# Patient Record
Sex: FEMALE | Race: BLACK OR AFRICAN AMERICAN | Employment: FULL TIME | URBAN - METROPOLITAN AREA
[De-identification: names, ages, dates, MRNs, and addresses within clinical notes are randomized per-mention and may not be internally consistent; named-entity substitution may affect disease eponyms.]

---

## 2019-11-29 ENCOUNTER — TELEPHONE (OUTPATIENT)
Dept: OBGYN CLINIC | Facility: CLINIC | Age: 29
End: 2019-11-29

## 2019-11-29 ENCOUNTER — OFFICE VISIT (OUTPATIENT)
Dept: OBGYN CLINIC | Facility: CLINIC | Age: 29
End: 2019-11-29
Payer: COMMERCIAL

## 2019-11-29 ENCOUNTER — APPOINTMENT (OUTPATIENT)
Dept: LAB | Facility: HOSPITAL | Age: 29
End: 2019-11-29
Attending: ADVANCED PRACTICE MIDWIFE
Payer: COMMERCIAL

## 2019-11-29 VITALS — SYSTOLIC BLOOD PRESSURE: 134 MMHG | WEIGHT: 150 LBS | DIASTOLIC BLOOD PRESSURE: 83 MMHG | HEART RATE: 94 BPM

## 2019-11-29 DIAGNOSIS — Z11.3 SCREEN FOR STD (SEXUALLY TRANSMITTED DISEASE): ICD-10-CM

## 2019-11-29 DIAGNOSIS — B37.3 YEAST INFECTION INVOLVING THE VAGINA AND SURROUNDING AREA: ICD-10-CM

## 2019-11-29 DIAGNOSIS — B37.3 YEAST INFECTION INVOLVING THE VAGINA AND SURROUNDING AREA: Primary | ICD-10-CM

## 2019-11-29 PROCEDURE — 87210 SMEAR WET MOUNT SALINE/INK: CPT | Performed by: ADVANCED PRACTICE MIDWIFE

## 2019-11-29 PROCEDURE — 83036 HEMOGLOBIN GLYCOSYLATED A1C: CPT

## 2019-11-29 PROCEDURE — 36415 COLL VENOUS BLD VENIPUNCTURE: CPT

## 2019-11-29 PROCEDURE — 99202 OFFICE O/P NEW SF 15 MIN: CPT | Performed by: ADVANCED PRACTICE MIDWIFE

## 2019-11-29 RX ORDER — NORETHINDRONE ACETATE AND ETHINYL ESTRADIOL 1; .02 MG/1; MG/1
1 TABLET ORAL DAILY
COMMUNITY

## 2019-11-29 RX ORDER — FLUCONAZOLE 150 MG/1
150 TABLET ORAL
Qty: 3 TABLET | Refills: 0 | Status: SHIPPED | OUTPATIENT
Start: 2019-11-29 | End: 2019-12-06

## 2019-11-29 NOTE — TELEPHONE ENCOUNTER
Pt was advised she is to take Fluconazole 150mg every 3 days for 3 doses. Pt verbalized understanding.

## 2019-11-29 NOTE — PROGRESS NOTES
Eriberto Chambers is a 34year old female. HPI:   Patient presents with:  Gyn Problem: NP, c/o persistent vaginal irritation since July. Notes itching, intercourse can be painful    Reports vaginal itching and irritation since July.    Has seen different prov pain, vaginal bleeding, difficulty urinating, genital sores, menstrual problem, pelvic pain, sexual dysfunction, breast mass, breast pain and hot flashes.        PHYSICAL EXAM:      11/29/19  1413   BP: 134/83   Pulse: 94     Physical Exam   Vitals reviewed

## 2020-04-16 ENCOUNTER — TELEPHONE (OUTPATIENT)
Dept: OBGYN CLINIC | Facility: CLINIC | Age: 30
End: 2020-04-16

## 2020-04-16 NOTE — TELEPHONE ENCOUNTER
Pt scheduled via [address] to leave on schedule?             Appointment For: Eriberto Chambers ([id number])   Visit Type: Cindy Peters ([id number])      4/21/2020   10:00 AM  15 mins. Abhi Espino CNM  [facility]      Patient Comments:   Vaginal Con

## 2020-04-17 NOTE — TELEPHONE ENCOUNTER
Pt is okay with phone visit, okay to leave on schedule for same day?  Or does it need to be scheduled a different day

## 2020-04-21 ENCOUNTER — PATIENT MESSAGE (OUTPATIENT)
Dept: OBGYN CLINIC | Facility: CLINIC | Age: 30
End: 2020-04-21

## 2020-04-21 ENCOUNTER — TELEMEDICINE (OUTPATIENT)
Dept: OBGYN CLINIC | Facility: CLINIC | Age: 30
End: 2020-04-21

## 2020-04-21 DIAGNOSIS — B37.3 YEAST INFECTION INVOLVING THE VAGINA AND SURROUNDING AREA: Primary | ICD-10-CM

## 2020-04-21 PROCEDURE — 99213 OFFICE O/P EST LOW 20 MIN: CPT | Performed by: ADVANCED PRACTICE MIDWIFE

## 2020-04-21 RX ORDER — FLUCONAZOLE 150 MG/1
150 TABLET ORAL
Qty: 2 TABLET | Refills: 0 | Status: SHIPPED | OUTPATIENT
Start: 2020-04-21 | End: 2020-04-25

## 2020-04-21 NOTE — TELEPHONE ENCOUNTER
From: Chantal Jin  To: Yael Velazquez CNM  Sent: 4/21/2020 11:22 AM CDT  Subject: Other    Hello, will there still be an appointment today?  It was scheduled for 11:15am.

## 2020-04-21 NOTE — PROGRESS NOTES
Melanie White is a 34year old female. HPI:   No chief complaint on file. No LMP recorded. This visit was performed via telehealth with video and audio and pt consented to visit. In March had wisdom teeth pulled and was on antibiotics.  Pelon Irwin Exam    Constitutional: She is oriented to person, place, and time. She appears well-developed and well-nourished. Pulmonary/Chest: Effort normal.   No distress     Neurological: She is alert and oriented to person, place, and time.         ASSESSMENT/LORENE

## 2020-12-29 ENCOUNTER — TELEPHONE (OUTPATIENT)
Dept: OBGYN CLINIC | Facility: CLINIC | Age: 30
End: 2020-12-29

## 2020-12-29 NOTE — TELEPHONE ENCOUNTER
Patient last seen in 2019, patient indicates she is 22 weeks pregnant and asking if she can still be seen. Patient also has questions about labor/delvery in tub. Also asking if rx:netrooxcid during covid.  Please call UA:[phone] or send response floyd

## 2020-12-29 NOTE — TELEPHONE ENCOUNTER
Pt's questions answered. Pt advised she can fax her PN records to our office so CNM can review for CHRISTELLE. Pt agreed and voiced understanding.

## 2021-01-04 ENCOUNTER — PATIENT MESSAGE (OUTPATIENT)
Dept: OBGYN CLINIC | Facility: CLINIC | Age: 31
End: 2021-01-04

## 2021-01-06 ENCOUNTER — TELEPHONE (OUTPATIENT)
Dept: OBGYN CLINIC | Facility: CLINIC | Age: 31
End: 2021-01-06

## 2021-01-06 NOTE — TELEPHONE ENCOUNTER
Please have pt make meet & greet appt. I have some mychart sent to me from this patient. I wasn't sure what it was but looks like maybe she was sending records for transfer when I look at phone encounters.  I can see her labs and ultrasound but not much els

## 2021-05-24 ENCOUNTER — TELEPHONE (OUTPATIENT)
Dept: LACTATION | Facility: HOSPITAL | Age: 31
End: 2021-05-24

## 2021-05-26 ENCOUNTER — NURSE ONLY (OUTPATIENT)
Dept: LACTATION | Facility: HOSPITAL | Age: 31
End: 2021-05-26
Attending: ADVANCED PRACTICE MIDWIFE
Payer: COMMERCIAL

## 2021-05-26 DIAGNOSIS — O92.79 DISORDER OF LACTATION, POSTPARTUM CONDITION OR COMPLICATION: Primary | ICD-10-CM

## 2021-05-26 PROCEDURE — 99213 OFFICE O/P EST LOW 20 MIN: CPT

## 2021-05-26 NOTE — PROGRESS NOTES
LACTATION NOTE - MOTHER      Evaluation Type: Outpatient Initial    Problems identified  Problems identified: Milk supply WNL; Knowledge deficit    Maternal history  Other/comment: denies    Breastfeeding goal  Breastfeeding goal: To maintain breast milk fe breast.  Recommendation is to get the Spectra pump with a hospital strength motor. Ameda Platinum pump worked well to express milk and could consider renting this to protect milk supply.   Diet for Breastfeeding:  Diet discussed at visit and dairy may not

## 2021-05-26 NOTE — PATIENT INSTRUCTIONS
Infant Discharge Feeding Plan -      Snuggle your baby in skin to skin contact between and during feedings whenever possible. Massage your breasts before nursing or pumping to soften areola if needed.     Breastfeed with hunger cues: Most babies wi formula if breast milk is not to volume infant requires. Hour of Age  Intake (mL/feed)  1st 24 hours 2–10  24–48 hours 5–15  48–72 hours 15–30  72–96 hours 30–60  Day 10: 2-3 ounces per feeding. 4 weeks: 3-4 ounces or more per feeding.     Paced bottle [phone])  • Call your baby's doctor with questions as well. For additional information: [employer]Home    Breast Pump Recommendation:  Lino Fulton is not working to draw milk from the breast.  Recommendation is to get the Spectra pump with a hosp

## 2022-02-11 ENCOUNTER — OFFICE VISIT (OUTPATIENT)
Dept: OBGYN CLINIC | Facility: CLINIC | Age: 32
End: 2022-02-11
Payer: COMMERCIAL

## 2022-02-11 VITALS
DIASTOLIC BLOOD PRESSURE: 71 MMHG | SYSTOLIC BLOOD PRESSURE: 101 MMHG | WEIGHT: 150 LBS | HEIGHT: 65 IN | BODY MASS INDEX: 24.99 KG/M2 | HEART RATE: 76 BPM

## 2022-02-11 DIAGNOSIS — Z13.220 SCREENING CHOLESTEROL LEVEL: Primary | ICD-10-CM

## 2022-02-11 DIAGNOSIS — Z13.0 SCREENING FOR DEFICIENCY ANEMIA: ICD-10-CM

## 2022-02-11 DIAGNOSIS — Z01.419 WOMEN'S ANNUAL ROUTINE GYNECOLOGICAL EXAMINATION: ICD-10-CM

## 2022-02-11 PROCEDURE — 3008F BODY MASS INDEX DOCD: CPT | Performed by: ADVANCED PRACTICE MIDWIFE

## 2022-02-11 PROCEDURE — 3074F SYST BP LT 130 MM HG: CPT | Performed by: ADVANCED PRACTICE MIDWIFE

## 2022-02-11 PROCEDURE — 99395 PREV VISIT EST AGE 18-39: CPT | Performed by: ADVANCED PRACTICE MIDWIFE

## 2022-02-11 PROCEDURE — 3078F DIAST BP <80 MM HG: CPT | Performed by: ADVANCED PRACTICE MIDWIFE

## 2022-03-14 ENCOUNTER — TELEPHONE (OUTPATIENT)
Dept: OBGYN CLINIC | Facility: CLINIC | Age: 32
End: 2022-03-14

## 2022-05-18 ENCOUNTER — TELEPHONE (OUTPATIENT)
Dept: OBGYN CLINIC | Facility: CLINIC | Age: 32
End: 2022-05-18

## 2022-12-20 ENCOUNTER — TELEPHONE (OUTPATIENT)
Dept: OBGYN CLINIC | Facility: CLINIC | Age: 32
End: 2022-12-20

## 2022-12-20 NOTE — TELEPHONE ENCOUNTER
Patient want a nurse to call she has a positive pregnancy test want to speak with a nurse to see if its too soon to make an appointment . ...

## 2022-12-20 NOTE — TELEPHONE ENCOUNTER
Spoke to patient. Pt has two positive pregnancy test at home. Pt has already scheduled visit for 12/23 at [address] [place] [address].  Pt verbalized understanding and agrees with care plan

## 2022-12-23 ENCOUNTER — OFFICE VISIT (OUTPATIENT)
Dept: OBGYN CLINIC | Facility: CLINIC | Age: 32
End: 2022-12-23
Payer: COMMERCIAL

## 2022-12-23 VITALS
HEART RATE: 94 BPM | SYSTOLIC BLOOD PRESSURE: 119 MMHG | BODY MASS INDEX: 23.99 KG/M2 | WEIGHT: 144 LBS | HEIGHT: 65 IN | DIASTOLIC BLOOD PRESSURE: 85 MMHG

## 2022-12-23 DIAGNOSIS — N92.6 MISSED MENSES: Primary | ICD-10-CM

## 2022-12-23 LAB
CONTROL LINE PRESENT WITH A CLEAR BACKGROUND (YES/NO): YES YES/NO
PREGNANCY TEST, URINE: POSITIVE

## 2022-12-23 PROCEDURE — 3079F DIAST BP 80-89 MM HG: CPT | Performed by: ADVANCED PRACTICE MIDWIFE

## 2022-12-23 PROCEDURE — 3074F SYST BP LT 130 MM HG: CPT | Performed by: ADVANCED PRACTICE MIDWIFE

## 2022-12-23 PROCEDURE — 81025 URINE PREGNANCY TEST: CPT | Performed by: ADVANCED PRACTICE MIDWIFE

## 2022-12-23 PROCEDURE — 99214 OFFICE O/P EST MOD 30 MIN: CPT | Performed by: ADVANCED PRACTICE MIDWIFE

## 2022-12-23 PROCEDURE — 3008F BODY MASS INDEX DOCD: CPT | Performed by: ADVANCED PRACTICE MIDWIFE

## 2022-12-23 RX ORDER — CHOLECALCIFEROL (VITAMIN D3) 25 MCG
1 TABLET,CHEWABLE ORAL DAILY
COMMUNITY

## 2023-01-09 ENCOUNTER — NURSE ONLY (OUTPATIENT)
Dept: OBGYN CLINIC | Facility: CLINIC | Age: 33
End: 2023-01-09
Payer: COMMERCIAL

## 2023-01-09 ENCOUNTER — TELEPHONE (OUTPATIENT)
Dept: OBGYN CLINIC | Facility: CLINIC | Age: 33
End: 2023-01-09

## 2023-01-09 DIAGNOSIS — Z34.81 ENCOUNTER FOR SUPERVISION OF OTHER NORMAL PREGNANCY IN FIRST TRIMESTER: Primary | ICD-10-CM

## 2023-01-09 NOTE — TELEPHONE ENCOUNTER
Pt asking if 1st trimester ultrasound can be scheduled at [facility], or does this have to be later.     Pls advise

## 2023-01-09 NOTE — TELEPHONE ENCOUNTER
Spoke to patient advised ultrasound can be schedule for 7 wks.  Patient agrees with plan and verbally understands

## 2023-01-09 NOTE — PROGRESS NOTES
Phone OB RN education visit completed, educational material reviewed. Pt verbalized understanding. Orders placed for NOB labs including varicella titers. Pt has previously completed hemoglobin evaluation and results were normal. Pt's last pap smear was in  and was normal.  Pt undecided about genetic screening, pt aware of time frame. Pt will complete EPDS and EVETTE-7 during next appt. Pt was scheduled for NOB appt. Pt desires natural birth. Pt. Has answered NO 5P questions and has NO  risk factors. Pt. Given What pregnant women need to know handout. Pt counseled on ACOG & ACNM guidelines recommending carrier testing. Carrier Testing, including Hemoglobin Evaluation offered through pt & insurance choice of [facility] or [facility]. Pt undecided.

## 2023-01-31 ENCOUNTER — LAB ENCOUNTER (OUTPATIENT)
Dept: LAB | Facility: HOSPITAL | Age: 33
End: 2023-01-31
Attending: ADVANCED PRACTICE MIDWIFE
Payer: COMMERCIAL

## 2023-01-31 DIAGNOSIS — Z34.81 ENCOUNTER FOR SUPERVISION OF OTHER NORMAL PREGNANCY IN FIRST TRIMESTER: ICD-10-CM

## 2023-01-31 LAB
ANTIBODY SCREEN: NEGATIVE
BASOPHILS # BLD AUTO: 0.01 X10(3) UL (ref 0–0.2)
BASOPHILS NFR BLD AUTO: 0.2 %
DEPRECATED RDW RBC AUTO: 42 FL (ref 35.1–46.3)
EOSINOPHIL # BLD AUTO: 0.02 X10(3) UL (ref 0–0.7)
EOSINOPHIL NFR BLD AUTO: 0.4 %
ERYTHROCYTE [DISTWIDTH] IN BLOOD BY AUTOMATED COUNT: 13.9 % (ref 11–15)
HBV SURFACE AG SER-ACNC: <0.1 [IU]/L
HBV SURFACE AG SERPL QL IA: NONREACTIVE
HCT VFR BLD AUTO: 37.4 %
HCV AB SERPL QL IA: NONREACTIVE
HGB BLD-MCNC: 12.3 G/DL
IMM GRANULOCYTES # BLD AUTO: 0.01 X10(3) UL (ref 0–1)
IMM GRANULOCYTES NFR BLD: 0.2 %
LYMPHOCYTES # BLD AUTO: 1.48 X10(3) UL (ref 1–4)
LYMPHOCYTES NFR BLD AUTO: 32.3 %
MCH RBC QN AUTO: 27.2 PG (ref 26–34)
MCHC RBC AUTO-ENTMCNC: 32.9 G/DL (ref 31–37)
MCV RBC AUTO: 82.7 FL
MONOCYTES # BLD AUTO: 0.35 X10(3) UL (ref 0.1–1)
MONOCYTES NFR BLD AUTO: 7.6 %
NEUTROPHILS # BLD AUTO: 2.71 X10 (3) UL (ref 1.5–7.7)
NEUTROPHILS # BLD AUTO: 2.71 X10(3) UL (ref 1.5–7.7)
NEUTROPHILS NFR BLD AUTO: 59.3 %
PLATELET # BLD AUTO: 210 10(3)UL (ref 150–450)
RBC # BLD AUTO: 4.52 X10(6)UL
RH BLOOD TYPE: POSITIVE
RUBV IGG SER QL: POSITIVE
RUBV IGG SER-ACNC: 226.8 IU/ML (ref 10–?)
WBC # BLD AUTO: 4.6 X10(3) UL (ref 4–11)

## 2023-01-31 PROCEDURE — 86900 BLOOD TYPING SEROLOGIC ABO: CPT

## 2023-01-31 PROCEDURE — 86901 BLOOD TYPING SEROLOGIC RH(D): CPT

## 2023-01-31 PROCEDURE — 87086 URINE CULTURE/COLONY COUNT: CPT

## 2023-01-31 PROCEDURE — 86762 RUBELLA ANTIBODY: CPT

## 2023-01-31 PROCEDURE — 86780 TREPONEMA PALLIDUM: CPT

## 2023-01-31 PROCEDURE — 87340 HEPATITIS B SURFACE AG IA: CPT

## 2023-01-31 PROCEDURE — 87389 HIV-1 AG W/HIV-1&-2 AB AG IA: CPT

## 2023-01-31 PROCEDURE — 86787 VARICELLA-ZOSTER ANTIBODY: CPT

## 2023-01-31 PROCEDURE — 36415 COLL VENOUS BLD VENIPUNCTURE: CPT

## 2023-01-31 PROCEDURE — 85025 COMPLETE CBC W/AUTO DIFF WBC: CPT

## 2023-01-31 PROCEDURE — 86850 RBC ANTIBODY SCREEN: CPT

## 2023-01-31 PROCEDURE — 86803 HEPATITIS C AB TEST: CPT

## 2023-02-01 ENCOUNTER — TELEPHONE (OUTPATIENT)
Dept: OBGYN CLINIC | Facility: CLINIC | Age: 33
End: 2023-02-01

## 2023-02-01 LAB
T PALLIDUM AB SER QL: NEGATIVE
VZV IGG SER IA-ACNC: 3633 (ref 165–?)

## 2023-02-01 NOTE — TELEPHONE ENCOUNTER
Spoke with pt advised of normal lab results including HIV per MBW. Pt agreed and voiced understanding.

## 2023-02-20 ENCOUNTER — INITIAL PRENATAL (OUTPATIENT)
Dept: OBGYN CLINIC | Facility: CLINIC | Age: 33
End: 2023-02-20

## 2023-02-20 ENCOUNTER — HOSPITAL ENCOUNTER (OUTPATIENT)
Dept: ULTRASOUND IMAGING | Facility: HOSPITAL | Age: 33
Discharge: HOME | End: 2023-02-20
Attending: ADVANCED PRACTICE MIDWIFE
Payer: COMMERCIAL

## 2023-02-20 VITALS
WEIGHT: 145 LBS | HEART RATE: 87 BPM | SYSTOLIC BLOOD PRESSURE: 124 MMHG | BODY MASS INDEX: 24 KG/M2 | DIASTOLIC BLOOD PRESSURE: 76 MMHG

## 2023-02-20 DIAGNOSIS — Z11.3 SCREENING FOR STDS (SEXUALLY TRANSMITTED DISEASES): ICD-10-CM

## 2023-02-20 DIAGNOSIS — N89.8 VAGINAL DISCHARGE IN PREGNANCY IN FIRST TRIMESTER: ICD-10-CM

## 2023-02-20 DIAGNOSIS — N92.6 MISSED MENSES: ICD-10-CM

## 2023-02-20 DIAGNOSIS — Z34.81 ENCOUNTER FOR SUPERVISION OF OTHER NORMAL PREGNANCY IN FIRST TRIMESTER: Primary | ICD-10-CM

## 2023-02-20 DIAGNOSIS — O26.891 VAGINAL DISCHARGE IN PREGNANCY IN FIRST TRIMESTER: ICD-10-CM

## 2023-02-20 LAB — TRICHOMONAS SCREEN: NEGATIVE

## 2023-02-20 PROCEDURE — 3078F DIAST BP <80 MM HG: CPT | Performed by: ADVANCED PRACTICE MIDWIFE

## 2023-02-20 PROCEDURE — 76801 OB US < 14 WKS SINGLE FETUS: CPT | Performed by: ADVANCED PRACTICE MIDWIFE

## 2023-02-20 PROCEDURE — 76817 TRANSVAGINAL US OBSTETRIC: CPT | Performed by: ADVANCED PRACTICE MIDWIFE

## 2023-02-20 PROCEDURE — 93975 VASCULAR STUDY: CPT | Performed by: ADVANCED PRACTICE MIDWIFE

## 2023-02-20 PROCEDURE — 3074F SYST BP LT 130 MM HG: CPT | Performed by: ADVANCED PRACTICE MIDWIFE

## 2023-02-20 NOTE — PROGRESS NOTES
Some headaches - taking tylenol as needed. Hydrating and discussed can have caffeine as well. Discussed genetic testing, routine anatomy scan.

## 2023-02-21 LAB
C TRACH DNA SPEC QL NAA+PROBE: NEGATIVE
N GONORRHOEA DNA SPEC QL NAA+PROBE: NEGATIVE

## 2023-02-22 ENCOUNTER — TELEPHONE (OUTPATIENT)
Dept: OBGYN CLINIC | Facility: CLINIC | Age: 33
End: 2023-02-22

## 2023-02-22 LAB
GENITAL VAGINOSIS SCREEN: NEGATIVE
TRICHOMONAS SCREEN: NEGATIVE

## 2023-02-22 NOTE — TELEPHONE ENCOUNTER
----- Message from Braulio Reynolds CNM sent at 2/22/2023  1:45 PM CST -----  Normal ultrasound. Baby was measuring ahead 6 days. Confirm that she has regular and certain LMP. If not we should consider changing MARK. If she has regular 28 day cycles we can keep her MARK by LMP.  Thanks HAN

## 2023-02-22 NOTE — TELEPHONE ENCOUNTER
Notified pt of results & instructions per MJ. Pt states her cycles are regular & 28 days approx per her appt.  Pt verbalized an understanding & agrees w/ plan

## 2023-03-20 NOTE — PROGRESS NOTES
Marylou Barron is a 29year old  pt at 17w0d here for KRAEN  She is feeling well   Flu vac declined  Genetic testing desired  Pt started crying when we discussed her weight b/c it triggered memories of when her son had to be admitted to [address] [place] [address] for weight loss. She asked to call her Trg Jim Nathan. I spoke with both of them. Her weight gain is fine today. She also was upset that she was called about her US results and that there was a 6 day difference. She confirms she has regular 28 day cycles and that we will keep her EDC by her LMP of 23. I explained that future US will also have a difference in dates, but that we would be keeping her EDC of 23. ROS:  Denies cramping, bleeding, leaking of fluid. Fetus - feeling flutters     23  1615   BP: 127/89   Pulse: 103   Weight: 152 lb (68.9 kg)      See flowsheet  TW lbs  US 23- 12.6 WBD, 13.5 WBS, NL scan    Assessment/Plan:  IUP @ 17.0 wga  PTSD      Orders Placed This Encounter      Flulaval 6 months and older 0.5 ml PFS [[id number]]     Reviewed:  Danger Signs  [facility] Genetic test given to pt. Anatomy US 3-4 wks  RTC 4 wks    Pt verbalized understanding. All questions answered.   No barriers to learning identified

## 2023-03-21 ENCOUNTER — ROUTINE PRENATAL (OUTPATIENT)
Dept: OBGYN CLINIC | Facility: CLINIC | Age: 33
End: 2023-03-21

## 2023-03-21 VITALS
WEIGHT: 152 LBS | DIASTOLIC BLOOD PRESSURE: 89 MMHG | BODY MASS INDEX: 25 KG/M2 | SYSTOLIC BLOOD PRESSURE: 127 MMHG | HEART RATE: 103 BPM

## 2023-03-21 DIAGNOSIS — Z28.21 INFLUENZA VACCINE REFUSED: ICD-10-CM

## 2023-03-21 DIAGNOSIS — Z34.82 ENCOUNTER FOR SUPERVISION OF OTHER NORMAL PREGNANCY IN SECOND TRIMESTER: Primary | ICD-10-CM

## 2023-03-21 PROBLEM — F43.10 POST TRAUMATIC STRESS DISORDER (PTSD): Status: ACTIVE | Noted: 2023-03-21

## 2023-03-21 PROCEDURE — 3074F SYST BP LT 130 MM HG: CPT | Performed by: ADVANCED PRACTICE MIDWIFE

## 2023-03-21 PROCEDURE — 3079F DIAST BP 80-89 MM HG: CPT | Performed by: ADVANCED PRACTICE MIDWIFE

## 2023-03-22 ENCOUNTER — TELEPHONE (OUTPATIENT)
Dept: OBGYN CLINIC | Facility: CLINIC | Age: 33
End: 2023-03-22

## 2023-03-22 DIAGNOSIS — Z34.80 PRENATAL CARE OF MULTIGRAVIDA, ANTEPARTUM: Primary | ICD-10-CM

## 2023-03-22 NOTE — TELEPHONE ENCOUNTER
Pt called [facility] to schedule her level 1 ultrasound & they do not have any convenient availability. Told pt she can ask office to order a level 1 w/ radiology. Order placed. All questions answered. Call transferred to [facility] to schedule.  Pt verbalized an understanding & agrees w/ plan

## 2023-03-24 ENCOUNTER — TELEPHONE (OUTPATIENT)
Dept: PERINATAL CARE | Facility: HOSPITAL | Age: 33
End: 2023-03-24

## 2023-03-31 ENCOUNTER — TELEPHONE (OUTPATIENT)
Dept: OBGYN CLINIC | Facility: CLINIC | Age: 33
End: 2023-03-31

## 2023-03-31 NOTE — TELEPHONE ENCOUNTER
Spoke to patient. Explained to patient that I will look into this and see what they require from us. Will call pt back with information as soon as possible. Pt verbalized understanding and agrees with care plan.

## 2023-04-03 NOTE — TELEPHONE ENCOUNTER
Pt states she spoke w/ [facility] & found out that they submit the PA.  Pt verbalized an understanding & agrees w/ plan

## 2023-04-21 ENCOUNTER — ROUTINE PRENATAL (OUTPATIENT)
Dept: OBGYN CLINIC | Facility: CLINIC | Age: 33
End: 2023-04-21

## 2023-04-21 ENCOUNTER — HOSPITAL ENCOUNTER (OUTPATIENT)
Dept: PERINATAL CARE | Facility: HOSPITAL | Age: 33
Discharge: HOME | End: 2023-04-21
Attending: ADVANCED PRACTICE MIDWIFE
Payer: COMMERCIAL

## 2023-04-21 ENCOUNTER — HOSPITAL ENCOUNTER (OUTPATIENT)
Dept: PERINATAL CARE | Facility: HOSPITAL | Age: 33
Discharge: HOME | End: 2023-04-21
Attending: OBSTETRICS & GYNECOLOGY
Payer: COMMERCIAL

## 2023-04-21 VITALS
BODY MASS INDEX: 26 KG/M2 | DIASTOLIC BLOOD PRESSURE: 66 MMHG | HEART RATE: 87 BPM | SYSTOLIC BLOOD PRESSURE: 120 MMHG | WEIGHT: 155 LBS

## 2023-04-21 VITALS
SYSTOLIC BLOOD PRESSURE: 110 MMHG | HEART RATE: 76 BPM | BODY MASS INDEX: 24 KG/M2 | WEIGHT: 144 LBS | DIASTOLIC BLOOD PRESSURE: 71 MMHG

## 2023-04-21 DIAGNOSIS — Z36.3 SCREENING, ANTENATAL, FOR MALFORMATION BY ULTRASOUND: ICD-10-CM

## 2023-04-21 DIAGNOSIS — Z36.3 SCREENING, ANTENATAL, FOR MALFORMATION BY ULTRASOUND: Primary | ICD-10-CM

## 2023-04-21 DIAGNOSIS — Z34.82 PRENATAL CARE, SUBSEQUENT PREGNANCY IN SECOND TRIMESTER: Primary | ICD-10-CM

## 2023-04-21 PROCEDURE — 3078F DIAST BP <80 MM HG: CPT | Performed by: ADVANCED PRACTICE MIDWIFE

## 2023-04-21 PROCEDURE — 3074F SYST BP LT 130 MM HG: CPT | Performed by: ADVANCED PRACTICE MIDWIFE

## 2023-04-21 PROCEDURE — 76805 OB US >/= 14 WKS SNGL FETUS: CPT | Performed by: OBSTETRICS & GYNECOLOGY

## 2023-04-21 NOTE — PROGRESS NOTES
Prateek Null, is at 21w3d, here for her KAREN visit. Currently, she is feeling well. Denies 2nd trimester danger signs. Just came from anatomy scan. Declines NT screen today. Still trying to get prior approval from insurance for NIPT screening. Eating and hydrating appropriately. Vital signs and weight reviewed  See flowsheets   Anatomy scan WNL and reviewed with patient    Assessment/Plan: NT screening reviewed/offered but patient declined  Next visit: 4 weeks    Reviewed:   Prenatal visit schedule  Genetic/chromosomal screening in pregnancy  Danger signs    Pt verbalized understanding. All questions answered.  No barriers to learning identified

## 2023-05-09 ENCOUNTER — MED REC SCAN ONLY (OUTPATIENT)
Dept: OBGYN CLINIC | Facility: CLINIC | Age: 33
End: 2023-05-09

## 2023-05-22 ENCOUNTER — ROUTINE PRENATAL (OUTPATIENT)
Dept: OBGYN CLINIC | Facility: CLINIC | Age: 33
End: 2023-05-22

## 2023-05-22 VITALS
DIASTOLIC BLOOD PRESSURE: 68 MMHG | WEIGHT: 156.88 LBS | SYSTOLIC BLOOD PRESSURE: 107 MMHG | HEART RATE: 101 BPM | BODY MASS INDEX: 26 KG/M2

## 2023-05-22 DIAGNOSIS — Z34.82 ENCOUNTER FOR SUPERVISION OF OTHER NORMAL PREGNANCY IN SECOND TRIMESTER: Primary | ICD-10-CM

## 2023-05-22 PROCEDURE — 3078F DIAST BP <80 MM HG: CPT | Performed by: ADVANCED PRACTICE MIDWIFE

## 2023-05-22 PROCEDURE — 3074F SYST BP LT 130 MM HG: CPT | Performed by: ADVANCED PRACTICE MIDWIFE

## 2023-05-22 NOTE — PROGRESS NOTES
Feeling well. Endorses regular fetal movement. Denies contractions, LOF, vaginal bleeding. Discussed 3rd trimester labs and instructed patient to complete at 28wks. Reviewed warning signs and when to call.  KAREN 4 wks

## 2023-06-19 ENCOUNTER — ROUTINE PRENATAL (OUTPATIENT)
Dept: OBGYN CLINIC | Facility: CLINIC | Age: 33
End: 2023-06-19

## 2023-06-19 VITALS
HEART RATE: 76 BPM | SYSTOLIC BLOOD PRESSURE: 107 MMHG | DIASTOLIC BLOOD PRESSURE: 71 MMHG | WEIGHT: 157 LBS | BODY MASS INDEX: 26 KG/M2

## 2023-06-19 DIAGNOSIS — Z34.82 ENCOUNTER FOR SUPERVISION OF OTHER NORMAL PREGNANCY IN SECOND TRIMESTER: Primary | ICD-10-CM

## 2023-06-19 PROCEDURE — 3078F DIAST BP <80 MM HG: CPT | Performed by: ADVANCED PRACTICE MIDWIFE

## 2023-06-19 PROCEDURE — 3074F SYST BP LT 130 MM HG: CPT | Performed by: ADVANCED PRACTICE MIDWIFE

## 2023-06-23 ENCOUNTER — LAB ENCOUNTER (OUTPATIENT)
Dept: LAB | Facility: HOSPITAL | Age: 33
End: 2023-06-23
Attending: ADVANCED PRACTICE MIDWIFE
Payer: COMMERCIAL

## 2023-06-23 ENCOUNTER — TELEPHONE (OUTPATIENT)
Dept: OBGYN CLINIC | Facility: CLINIC | Age: 33
End: 2023-06-23

## 2023-06-23 DIAGNOSIS — Z34.82 ENCOUNTER FOR SUPERVISION OF OTHER NORMAL PREGNANCY IN SECOND TRIMESTER: ICD-10-CM

## 2023-06-23 LAB
DEPRECATED RDW RBC AUTO: 41.9 FL (ref 35.1–46.3)
ERYTHROCYTE [DISTWIDTH] IN BLOOD BY AUTOMATED COUNT: 13.6 % (ref 11–15)
GLUCOSE 1H P GLC SERPL-MCNC: 95 MG/DL
HCT VFR BLD AUTO: 35.3 %
HGB BLD-MCNC: 11.4 G/DL
MCH RBC QN AUTO: 27.3 PG (ref 26–34)
MCHC RBC AUTO-ENTMCNC: 32.3 G/DL (ref 31–37)
MCV RBC AUTO: 84.4 FL
PLATELET # BLD AUTO: 176 10(3)UL (ref 150–450)
RBC # BLD AUTO: 4.18 X10(6)UL
WBC # BLD AUTO: 4.8 X10(3) UL (ref 4–11)

## 2023-06-23 PROCEDURE — 36415 COLL VENOUS BLD VENIPUNCTURE: CPT

## 2023-06-23 PROCEDURE — 82950 GLUCOSE TEST: CPT

## 2023-06-23 PROCEDURE — 87389 HIV-1 AG W/HIV-1&-2 AB AG IA: CPT

## 2023-06-23 PROCEDURE — 86780 TREPONEMA PALLIDUM: CPT

## 2023-06-23 PROCEDURE — 85027 COMPLETE CBC AUTOMATED: CPT

## 2023-06-23 NOTE — TELEPHONE ENCOUNTER
Breast Pump written order form signed and faxed to [facility]. at [phone]. Sent to scan.

## 2023-06-26 LAB — T PALLIDUM AB SER QL: NEGATIVE

## 2023-07-03 ENCOUNTER — ROUTINE PRENATAL (OUTPATIENT)
Dept: OBGYN CLINIC | Facility: CLINIC | Age: 33
End: 2023-07-03

## 2023-07-03 VITALS — WEIGHT: 160 LBS | DIASTOLIC BLOOD PRESSURE: 79 MMHG | SYSTOLIC BLOOD PRESSURE: 111 MMHG | BODY MASS INDEX: 27 KG/M2

## 2023-07-03 DIAGNOSIS — Z34.83 ENCOUNTER FOR SUPERVISION OF OTHER NORMAL PREGNANCY IN THIRD TRIMESTER: Primary | ICD-10-CM

## 2023-07-03 PROCEDURE — 3074F SYST BP LT 130 MM HG: CPT | Performed by: ADVANCED PRACTICE MIDWIFE

## 2023-07-03 PROCEDURE — 3078F DIAST BP <80 MM HG: CPT | Performed by: ADVANCED PRACTICE MIDWIFE

## 2023-07-03 NOTE — PROGRESS NOTES
Sophy Gray, is at [address], here for her KAREN visit. Currently, she is feeling well. Denies 3rd trimester danger signs. Vital signs and weight reviewed  See flowsheets     Patient Active Problem List:     Post traumatic stress disorder (PTSD)     Assessment/Plan:   Care is up to date   3T labs reviewed and normal for pregnancy   Pt still considering Tdap, would like offered again NV  Next visit: 2 weeks    Reviewed:   Prenatal visit schedule  Recommendations and rationale for Tdap vaccine in pregnancy  3rd trimester precautions and expectations   labor precautions  Kick counts  Danger signs and when to call CNM   Current L&D policies:   3 visitors in L&D   No water birth available at this time    Pt verbalized understanding. All questions answered. No barriers to learning identified    BERNARD Calzada/GABBY under direct supervision of Yenny Menchaca CNM    Notified pt no longer doing waterbirth, ok for hydrotherapy for labor. Current visitor policy reviewed    I personally performed the patient's exam and medical decision making on this date of service. I was physically present in the room for the performance of the E/M service. I have reviewed the YOLIS student's documentation and findings including history, Exam, and Medical Decision Making, edited the document for accuracy and verify that it represents the clinical findings and services performed.   Kirstie Corona CNM

## 2023-07-12 ENCOUNTER — TELEPHONE (OUTPATIENT)
Dept: OBGYN CLINIC | Facility: CLINIC | Age: 33
End: 2023-07-12

## 2023-07-12 NOTE — TELEPHONE ENCOUNTER
Patient calling to share that the [facility] has not received the labs. Please fax again. She would also like to  a copy of her records. Please call.

## 2023-07-12 NOTE — TELEPHONE ENCOUNTER
Pt Name and  verified. Pt wanted to know if her labs could be faxed over to new facility she will be seeking care at [facility]. [phone]    Labs faxed over via [employer].

## 2023-07-12 NOTE — TELEPHONE ENCOUNTER
Pt is asking to have her records transferred to [facility] .  Asking if a nurse can call her ,

## 2023-07-13 NOTE — TELEPHONE ENCOUNTER
Pt Name and  verified. Informed pt that records were faxed along with labs. Will refax again via [employer]. Pt able to  her records as well.  PHAM.

## 2024-10-22 ENCOUNTER — OFFICE VISIT (OUTPATIENT)
Dept: OBGYN CLINIC | Facility: CLINIC | Age: 34
End: 2024-10-22

## 2024-10-22 VITALS
HEIGHT: 65 IN | SYSTOLIC BLOOD PRESSURE: 112 MMHG | BODY MASS INDEX: 25.6 KG/M2 | WEIGHT: 153.63 LBS | DIASTOLIC BLOOD PRESSURE: 75 MMHG

## 2024-10-22 DIAGNOSIS — Z11.3 ROUTINE SCREENING FOR STI (SEXUALLY TRANSMITTED INFECTION): ICD-10-CM

## 2024-10-22 DIAGNOSIS — Z12.4 PAPANICOLAOU SMEAR FOR CERVICAL CANCER SCREENING: ICD-10-CM

## 2024-10-22 DIAGNOSIS — Z01.419 ENCOUNTER FOR WELL WOMAN EXAM WITH ROUTINE GYNECOLOGICAL EXAM: Primary | ICD-10-CM

## 2024-10-22 DIAGNOSIS — R10.2 PELVIC PAIN: ICD-10-CM

## 2024-10-22 LAB
APPEARANCE: CLEAR
BILIRUBIN: NEGATIVE
CONTROL LINE PRESENT WITH A CLEAR BACKGROUND (YES/NO): YES YES/NO
GLUCOSE (URINE DIPSTICK): NEGATIVE MG/DL
KETONES (URINE DIPSTICK): 40 MG/DL
KIT LOT #: NORMAL NUMERIC
LEUKOCYTES: NEGATIVE
MULTISTIX LOT#: ABNORMAL NUMERIC
NITRITE, URINE: NEGATIVE
OCCULT BLOOD: NEGATIVE
PH, URINE: 6 (ref 4.5–8)
PREGNANCY TEST, URINE: NEGATIVE
PROTEIN (URINE DIPSTICK): NEGATIVE MG/DL
SPECIFIC GRAVITY: 1.03 (ref 1–1.03)
URINE-COLOR: YELLOW
UROBILINOGEN,SEMI-QN: 0.2 MG/DL (ref 0–1.9)

## 2024-10-22 PROCEDURE — 3008F BODY MASS INDEX DOCD: CPT | Performed by: ADVANCED PRACTICE MIDWIFE

## 2024-10-22 PROCEDURE — 81025 URINE PREGNANCY TEST: CPT | Performed by: ADVANCED PRACTICE MIDWIFE

## 2024-10-22 PROCEDURE — 99395 PREV VISIT EST AGE 18-39: CPT | Performed by: ADVANCED PRACTICE MIDWIFE

## 2024-10-22 PROCEDURE — 3074F SYST BP LT 130 MM HG: CPT | Performed by: ADVANCED PRACTICE MIDWIFE

## 2024-10-22 PROCEDURE — 3078F DIAST BP <80 MM HG: CPT | Performed by: ADVANCED PRACTICE MIDWIFE

## 2024-10-22 PROCEDURE — 81002 URINALYSIS NONAUTO W/O SCOPE: CPT | Performed by: ADVANCED PRACTICE MIDWIFE

## 2024-10-22 NOTE — PROGRESS NOTES
Stephan Lou is a 34 year old female here for an annual exam and is experiencing right sided pelvic pain intermittently. She hasn't tracked the pain related to her menses or bowel movements. Intermittently she has pelvic pressure with urinating, denies burning or urgency. Bowel habits unchanged and denies constipation. Occasionally pain with intercourse. Pain mild, has not needed to take anything for it.     HPI:       Stephan Lou is a 34 year old female.   Patient's last menstrual period was 10/09/2024 (exact date).    Chief Complaint   Patient presents with    Annual     Per pt also feels some discomfort       Denies abnormal discharge or vaginal irritation.      Hx Prior Abnormal Pap: No    Period Cycle (Days): 28-30  Period Duration (Days): 4 days  Period Flow: light  Use of Birth Control (if yes, specify type): Condoms  Hx Prior Abnormal Pap: No    Using condoms for birth control. Happy with method at this time. Discussed hormonal/non-hormonal IUDs and OCPs    Current partner x 7 years. Monogamous relationship.   Feels safe. Denies abuse  Desires STD testing    Last pap:, NIL Hx of abnormal pap-denies    Diet: well balanced  Exercise: walking     Denies excessive ETOH use, no marijuana, smoking, vapping or any non prescriptive drug use.     Family hx of breast or ovarian CA: none    HPV Vaccine: Declined    Note: This is a gyn only visit. Pt  is referred back to PCP for care of any non-gyn concerns listed above and  in the Problem List.      PHQ-2 SCORE: 0  , done 10/22/2024          Depression Screening (PHQ-2/PHQ-9): Over the LAST 2 WEEKS   Little interest or pleasure in doing things (over the last two weeks)?: Not at all  Little interest or pleasure in doing things: Not at all    Feeling down, depressed, or hopeless (over the last two weeks)?: Not at all  Feeling down, depressed, or hopeless: Not at all    PHQ-2 SCORE: 0  PHQ-2 SCORE: 0           HISTORY:  Past Medical History:    Dysmenorrhea       Past Surgical History:   Procedure Laterality Date    Other surgical history  10 years ago    Torn ACL      Family History   Problem Relation Age of Onset    Diabetes Maternal Grandmother     Hypertension Maternal Grandmother     Breast Cancer Maternal Aunt 40      Social History:   Social History     Socioeconomic History    Marital status:      Spouse name: Gorge Lou    Number of children: 1    Years of education: 18    Highest education level: Master's degree (e.g., MA, MS, Abdirahman, MEd, MSW, FRANDY)   Occupational History    Occupation:    Tobacco Use    Smoking status: Never    Smokeless tobacco: Never   Substance and Sexual Activity    Alcohol use: Not Currently     Comment: Socially    Drug use: Never    Sexual activity: Yes     Partners: Male     Birth control/protection: OCP   Other Topics Concern    Blood Transfusions No    Caffeine Concern No    Occupational Exposure No    Special Diet No    Exercise No    Seat Belt Yes     Social Drivers of Health     Financial Resource Strain: Low Risk  (2023)    Financial Resource Strain     Difficulty of Paying Living Expenses: Not hard at all     Med Affordability: No   Food Insecurity: No Food Insecurity (2023)    Food Insecurity     Food Insecurity: Never true   Transportation Needs: No Transportation Needs (2023)    Transportation Needs     Lack of Transportation: No   Stress: No Stress Concern Present (2023)    Stress     Feeling of Stress : No   Housing Stability: Low Risk  (2023)    Housing Stability     Housing Instability: No        OB History    Para Term  AB Living   2 2 2 0 0 2   SAB IAB Ectopic Multiple Live Births   0 0 0 0 2       Medications (Active prior to today's visit):  No current outpatient medications on file.       Allergies:  Allergies[1]      ROS:     Review of Systems   Constitutional: Negative.    HENT: Negative.     Respiratory: Negative.     Cardiovascular: Negative.     Gastrointestinal: Negative.    Endocrine: Negative.    Genitourinary:  Positive for dyspareunia and pelvic pain. Negative for decreased urine volume, difficulty urinating, dysuria, menstrual problem, urgency, vaginal bleeding, vaginal discharge and vaginal pain.   Musculoskeletal: Negative.    Skin: Negative.    Allergic/Immunologic: Negative.    Neurological: Negative.    Psychiatric/Behavioral: Negative.           PHYSICAL EXAM:     Vitals:    10/22/24 1033   BP: 112/75     Physical Exam  Vitals reviewed.   Constitutional:       General: She is not in acute distress.     Appearance: Normal appearance. She is normal weight.   HENT:      Head: Normocephalic.   Cardiovascular:      Rate and Rhythm: Normal rate.   Pulmonary:      Effort: Pulmonary effort is normal.   Chest:   Breasts:     Right: Normal. No inverted nipple, mass, nipple discharge, skin change or tenderness.      Left: Normal. No inverted nipple, mass, nipple discharge, skin change or tenderness.   Abdominal:      General: Abdomen is flat.      Palpations: Abdomen is soft. There is no mass.      Tenderness: There is no abdominal tenderness. There is no guarding.   Genitourinary:     General: Normal vulva.      Labia:         Right: No rash, tenderness, lesion or injury.         Left: No rash, tenderness, lesion or injury.       Vagina: Normal. No signs of injury and foreign body. No vaginal discharge, erythema, tenderness, bleeding, lesions or prolapsed vaginal walls.      Cervix: Normal. No cervical motion tenderness, discharge, friability, lesion, erythema, cervical bleeding or eversion.      Uterus: Normal. Not deviated, not enlarged, not fixed, not tender and no uterine prolapse.       Adnexa: Left adnexa normal.        Right: Tenderness (slight tenderness with deep palpation) present. No mass or fullness.          Left: No mass, tenderness or fullness.        Rectum: Normal.   Skin:     General: Skin is warm and dry.   Neurological:      Mental  Status: She is alert.   Psychiatric:         Mood and Affect: Mood normal.         Behavior: Behavior normal.         Thought Content: Thought content normal.         Judgment: Judgment normal.        Component      Latest Ref Rng 10/22/2024   Glucose Urine      Negative mg/dL Negative    Bilirubin Urine      Negative  Negative    Ketones, UA      Negative - Trace mg/dL 40 !    Spec Gravity      1.005 - 1.030  1.030    Blood Urine      Negative  Negative    PH Urine      5.0 - 8.0  6.0    Protein Urine      Negative - Trace mg/dL Negative    Urobilinogen Urine      0.2 - 1.0 mg/dL 0.2    Nitrite Urine      Negative  Negative    Leukocyte Esterase Urine      Negative  Negative    APPEARANCE      Clear  clear    Color Urine      Yellow  yellow    Multistix Lot#      Numeric [id number]    Multistix Expiration Date      Date 11/30/2024       Urine HCG negative    ASSESSMENT/PLAN:      Diagnoses and all orders for this visit:    Encounter for well woman exam with routine gynecological exam  -     ThinPrep PAP Smear; Future  -     Hpv Dna  High Risk , Thin Prep Collect; Future    Papanicolaou smear for cervical cancer screening    Routine screening for STI (sexually transmitted infection)  -     Chlamydia/GC PCR Combo; Future    Pelvic pain  -     US PELVIS (TRANSABDOMINAL AND TRANSVAGINAL) (CPT=76856/14069); Future  -     POC Urinalysis, Manual Dip without microscopy [[id number]]        Normal gyne exam. Pap with HPV to lab  STD testing: Desires GC/CT  Discussed breast awareness  Discussed weight management and exercise  Vaccines offered today: declined flu and COVID vaccines  Discussed contraceptive options and prevention of STIs  F/u 1 year or prn     Pelvic ultrasound ordered for right sided pelvic pain. Encouraged Stephan to journal the pain related  to her menstrual cycles. Discussed possible ovulatory pain in nature and if with tracking occurs during ovulation then ultrasound unnecessary      BERNARD Riggs under  direct supervision and collaboration with Maribel Page CNM    I personally performed the patient's exam and medical decision making on this date of service.  I was physically present in the room for the performance of the E/M service.  I have reviewed the YOLIS student's documentation and findings including history, Exam, and Medical Decision Making, edited the document for accuracy and verify that it represents the clinical findings and services performed.  TERI Page CNM     10/22/2024  Maribel Page CNM          [1] No Known Allergies

## 2024-10-23 LAB
C TRACH DNA SPEC QL NAA+PROBE: NEGATIVE
HPV E6+E7 MRNA CVX QL NAA+PROBE: NEGATIVE
N GONORRHOEA DNA SPEC QL NAA+PROBE: NEGATIVE

## 2024-10-25 ENCOUNTER — TELEPHONE (OUTPATIENT)
Dept: OBGYN CLINIC | Facility: CLINIC | Age: 34
End: 2024-10-25

## 2024-10-25 RX ORDER — FLUCONAZOLE 150 MG/1
150 TABLET ORAL
Qty: 2 TABLET | Refills: 0 | Status: SHIPPED | OUTPATIENT
Start: 2024-10-25 | End: 2024-10-29

## 2024-10-25 NOTE — TELEPHONE ENCOUNTER
Pt verified name and .     Informed pt of normal pap results with recommendation for repeat pap in 5 years but to continue with routine annual exams. Informed pt that pap showed signs of yeast and rx was sent to pharmacy. Pt verbalized understanding and agreed. All questions answered.

## 2024-10-25 NOTE — TELEPHONE ENCOUNTER
----- Message from Maribel Page sent at 10/25/2024  8:20 AM CDT -----  Please let pt know pap negative. Next pap due 5 yrs, however we still recommend yearly gyne exams. Pap did show signs of yeast, so I have sent in Rx for fluconazole. Thanks MJ

## 2024-11-19 ENCOUNTER — PATIENT MESSAGE (OUTPATIENT)
Dept: OBGYN CLINIC | Facility: CLINIC | Age: 34
End: 2024-11-19